# Patient Record
Sex: MALE | Race: WHITE | NOT HISPANIC OR LATINO | Employment: FULL TIME | URBAN - NONMETROPOLITAN AREA
[De-identification: names, ages, dates, MRNs, and addresses within clinical notes are randomized per-mention and may not be internally consistent; named-entity substitution may affect disease eponyms.]

---

## 2018-11-27 ENCOUNTER — TRANSCRIBE ORDERS (OUTPATIENT)
Dept: LAB | Facility: HOSPITAL | Age: 38
End: 2018-11-27

## 2018-11-27 ENCOUNTER — LAB (OUTPATIENT)
Dept: LAB | Facility: HOSPITAL | Age: 38
End: 2018-11-27
Attending: INTERNAL MEDICINE

## 2018-11-27 DIAGNOSIS — E78.5 HYPERLIPIDEMIA, UNSPECIFIED HYPERLIPIDEMIA TYPE: ICD-10-CM

## 2018-11-27 DIAGNOSIS — I10 HYPERTENSION, ESSENTIAL: Primary | ICD-10-CM

## 2018-11-27 DIAGNOSIS — I10 HYPERTENSION, ESSENTIAL: ICD-10-CM

## 2018-11-27 DIAGNOSIS — R53.83 TIREDNESS: ICD-10-CM

## 2018-11-27 DIAGNOSIS — E11.9 DIABETES MELLITUS WITHOUT COMPLICATION (HCC): ICD-10-CM

## 2018-11-27 LAB
ALBUMIN SERPL-MCNC: 4.4 G/DL (ref 3.4–4.8)
ALBUMIN/GLOB SERPL: 1.4 G/DL (ref 1.1–1.8)
ALP SERPL-CCNC: 161 U/L (ref 38–126)
ALT SERPL W P-5'-P-CCNC: 152 U/L (ref 21–72)
ANION GAP SERPL CALCULATED.3IONS-SCNC: 9 MMOL/L (ref 5–15)
ARTICHOKE IGE QN: 167 MG/DL (ref 1–129)
AST SERPL-CCNC: 184 U/L (ref 17–59)
BASOPHILS # BLD AUTO: 0.03 10*3/MM3 (ref 0–0.2)
BASOPHILS NFR BLD AUTO: 0.4 % (ref 0–2)
BILIRUB SERPL-MCNC: 1.1 MG/DL (ref 0.2–1.3)
BUN BLD-MCNC: 8 MG/DL (ref 7–21)
BUN/CREAT SERPL: 11.6 (ref 7–25)
CALCIUM SPEC-SCNC: 9.4 MG/DL (ref 8.4–10.2)
CHLORIDE SERPL-SCNC: 94 MMOL/L (ref 95–110)
CHOLEST SERPL-MCNC: 244 MG/DL (ref 0–199)
CO2 SERPL-SCNC: 32 MMOL/L (ref 22–31)
CREAT BLD-MCNC: 0.69 MG/DL (ref 0.7–1.3)
DEPRECATED RDW RBC AUTO: 35.2 FL (ref 35.1–43.9)
EOSINOPHIL # BLD AUTO: 0.19 10*3/MM3 (ref 0–0.7)
EOSINOPHIL NFR BLD AUTO: 2.7 % (ref 0–7)
ERYTHROCYTE [DISTWIDTH] IN BLOOD BY AUTOMATED COUNT: 12.1 % (ref 11.5–14.5)
GFR SERPL CREATININE-BSD FRML MDRD: 130 ML/MIN/1.73 (ref 70–162)
GLOBULIN UR ELPH-MCNC: 3.2 GM/DL (ref 2.3–3.5)
GLUCOSE BLD-MCNC: 217 MG/DL (ref 60–100)
HBA1C MFR BLD: 10.2 % (ref 4–5.6)
HCT VFR BLD AUTO: 47.5 % (ref 39–49)
HDLC SERPL-MCNC: 24 MG/DL (ref 60–200)
HGB BLD-MCNC: 16.9 G/DL (ref 13.7–17.3)
IMM GRANULOCYTES # BLD: 0.01 10*3/MM3 (ref 0–0.02)
IMM GRANULOCYTES NFR BLD: 0.1 % (ref 0–0.5)
LDLC/HDLC SERPL: 5.94 {RATIO} (ref 0–3.55)
LYMPHOCYTES # BLD AUTO: 2.07 10*3/MM3 (ref 0.6–4.2)
LYMPHOCYTES NFR BLD AUTO: 29.7 % (ref 10–50)
MCH RBC QN AUTO: 28.7 PG (ref 26.5–34)
MCHC RBC AUTO-ENTMCNC: 35.6 G/DL (ref 31.5–36.3)
MCV RBC AUTO: 80.6 FL (ref 80–98)
MONOCYTES # BLD AUTO: 0.38 10*3/MM3 (ref 0–0.9)
MONOCYTES NFR BLD AUTO: 5.4 % (ref 0–12)
NEUTROPHILS # BLD AUTO: 4.3 10*3/MM3 (ref 2–8.6)
NEUTROPHILS NFR BLD AUTO: 61.7 % (ref 37–80)
PLATELET # BLD AUTO: 152 10*3/MM3 (ref 150–450)
PMV BLD AUTO: 11.2 FL (ref 8–12)
POTASSIUM BLD-SCNC: 3.9 MMOL/L (ref 3.5–5.1)
PROT SERPL-MCNC: 7.6 G/DL (ref 6.3–8.6)
RBC # BLD AUTO: 5.89 10*6/MM3 (ref 4.37–5.74)
SODIUM BLD-SCNC: 135 MMOL/L (ref 137–145)
T4 FREE SERPL-MCNC: 1.29 NG/DL (ref 0.78–2.19)
TRIGL SERPL-MCNC: 387 MG/DL (ref 20–199)
TSH SERPL DL<=0.05 MIU/L-ACNC: 3.02 MIU/ML (ref 0.46–4.68)
WBC NRBC COR # BLD: 6.98 10*3/MM3 (ref 3.2–9.8)

## 2018-11-27 PROCEDURE — 80061 LIPID PANEL: CPT

## 2018-11-27 PROCEDURE — 36415 COLL VENOUS BLD VENIPUNCTURE: CPT

## 2018-11-27 PROCEDURE — 85025 COMPLETE CBC W/AUTO DIFF WBC: CPT

## 2018-11-27 PROCEDURE — 84443 ASSAY THYROID STIM HORMONE: CPT

## 2018-11-27 PROCEDURE — 83036 HEMOGLOBIN GLYCOSYLATED A1C: CPT

## 2018-11-27 PROCEDURE — 84439 ASSAY OF FREE THYROXINE: CPT

## 2018-11-27 PROCEDURE — 80053 COMPREHEN METABOLIC PANEL: CPT

## 2019-10-16 ENCOUNTER — OFFICE VISIT (OUTPATIENT)
Dept: PODIATRY | Facility: CLINIC | Age: 39
End: 2019-10-16

## 2019-10-16 VITALS — HEART RATE: 98 BPM | HEIGHT: 70 IN | WEIGHT: 262 LBS | BODY MASS INDEX: 37.51 KG/M2 | OXYGEN SATURATION: 97 %

## 2019-10-16 DIAGNOSIS — M79.2 NEUROPATHIC PAIN OF FOOT, UNSPECIFIED LATERALITY: ICD-10-CM

## 2019-10-16 DIAGNOSIS — IMO0002 DM TYPE 2, UNCONTROLLED, WITH NEUROPATHY: Primary | ICD-10-CM

## 2019-10-16 LAB — GLUCOSE BLDC GLUCOMTR-MCNC: 254 MG/DL (ref 70–130)

## 2019-10-16 PROCEDURE — 82962 GLUCOSE BLOOD TEST: CPT | Performed by: PODIATRIST

## 2019-10-16 PROCEDURE — 99204 OFFICE O/P NEW MOD 45 MIN: CPT | Performed by: PODIATRIST

## 2019-10-16 RX ORDER — PRAVASTATIN SODIUM 80 MG/1
80 TABLET ORAL DAILY
COMMUNITY
Start: 2019-10-15

## 2019-10-16 RX ORDER — HYDROCHLOROTHIAZIDE 25 MG/1
25 TABLET ORAL DAILY
Refills: 0 | COMMUNITY
Start: 2019-07-18 | End: 2021-08-31

## 2019-10-16 RX ORDER — L-METHYLFOLATE-ALGAE-VIT B12-B6 CAP 3-90.314-2-35 MG 3-90.314-2-35 MG
1 CAP ORAL 2 TIMES DAILY
Qty: 60 CAPSULE | Refills: 1 | Status: SHIPPED | OUTPATIENT
Start: 2019-10-16 | End: 2021-08-31

## 2019-10-16 RX ORDER — LOSARTAN POTASSIUM 100 MG/1
TABLET ORAL
COMMUNITY
Start: 2019-10-15 | End: 2021-08-31

## 2019-10-16 RX ORDER — OMEPRAZOLE 40 MG/1
40 CAPSULE, DELAYED RELEASE ORAL DAILY
COMMUNITY
End: 2021-08-31

## 2019-10-16 RX ORDER — ASPIRIN 81 MG/1
81 TABLET, CHEWABLE ORAL DAILY
COMMUNITY

## 2019-10-16 RX ORDER — DULOXETIN HYDROCHLORIDE 30 MG/1
30 CAPSULE, DELAYED RELEASE ORAL DAILY
Qty: 30 CAPSULE | Refills: 0 | Status: SHIPPED | OUTPATIENT
Start: 2019-10-16 | End: 2021-12-21

## 2019-10-16 NOTE — PROGRESS NOTES
Fabrizio Castaneda  1980  39 y.o. male   KEYUR Juarez - states a couple of months ago  BS - 254 taken in office    Patient presents today with a complaint of diabetic neuropathy.    10/16/2019    Chief Complaint   Patient presents with   • Left Foot - diabetic foot care   • Right Foot - diabetic foot care       History of Present Illness    Fabrizio Castaneda is a 39 y.o.male presents to clinic today with chief complaint of foot pain.  Patient states for the last year he has had increasing pain to both of his feet.  He describes the pain as burning, tingling and pins-and-needles.  Pain is constant.  He rates the pain as an 8 out of 10.  He has done nothing to treat the pain.  He does admit to being a type II diabetic.  His blood sugar today was 254 mg/dL.  He denies any injuries to his feet.  He has no other pedal complaints.      Past Medical History:   Diagnosis Date   • Diabetes mellitus (CMS/[facility])    • GERD (gastroesophageal reflux disease)          History reviewed. No pertinent surgical history.      Family History   Problem Relation Age of Onset   • Diabetes Mother    • Hypertension Mother        Allergies   Allergen Reactions   • Sulfa Antibiotics Rash       Social History     Socioeconomic History   • Marital status:      Spouse name: Not on file   • Number of children: Not on file   • Years of education: Not on file   • Highest education level: Not on file   Tobacco Use   • Smoking status: Current Every Day Smoker     Types: Cigarettes   • Smokeless tobacco: Never Used   Substance and Sexual Activity   • Alcohol use: Defer   • Drug use: Defer   • Sexual activity: Defer         Current Outpatient Medications   Medication Sig Dispense Refill   • aspirin 81 MG chewable tablet Chew 81 mg Daily.     • hydroCHLOROthiazide (HYDRODIURIL) 25 MG tablet Take 25 mg by mouth Daily.  0   • losartan (COZAAR) 100 MG tablet      • metFORMIN (GLUCOPHAGE) 500 MG tablet Take 1,000 mg by mouth 2 (Two) Times  "a Day.  0   • omeprazole (priLOSEC) 40 MG capsule Take 40 mg by mouth Daily.     • pravastatin (PRAVACHOL) 80 MG tablet      • DULoxetine (CYMBALTA) 30 MG capsule Take 1 capsule by mouth Daily. 30 capsule 0   • l-methylfolate-algae-B6-B12 (METANX) 3-90.314-2-35 MG capsule capsule Take 1 capsule by mouth 2 (Two) Times a Day. 60 capsule 1     No current facility-administered medications for this visit.        Review of Systems   Constitutional: Negative.    HENT: Negative.    Eyes: Negative.    Respiratory: Negative.    Cardiovascular: Negative.    Gastrointestinal: Negative.    Endocrine: Negative.    Musculoskeletal:        Foot pain   Skin: Negative.    Allergic/Immunologic: Negative.    Neurological: Positive for numbness. Negative for dizziness.   Psychiatric/Behavioral: Negative.          OBJECTIVE    Pulse 98   Ht 177.8 cm (70\")   Wt 119 kg (262 lb)   SpO2 97%   BMI 37.59 kg/m²       Physical Exam   Constitutional: He is oriented to person, place, and time. He appears well-developed and well-nourished. No distress.   HENT:   Head: Normocephalic and atraumatic.   Nose: Nose normal.   Eyes: Conjunctivae and EOM are normal. Pupils are equal, round, and reactive to light.   Neck: Normal range of motion. No tracheal deviation present.   Cardiovascular: Normal rate and intact distal pulses.   Pulmonary/Chest: Effort normal. No respiratory distress. He has no wheezes.   Musculoskeletal: Normal range of motion. He exhibits no edema or deformity.    Fabrizio had a diabetic foot exam performed today.   During the foot exam he had a monofilament test performed.  Neurological: He is alert and oriented to person, place, and time. He displays normal reflexes.   Skin: Skin is warm and dry. Capillary refill takes less than 2 seconds.   Psychiatric: He has a normal mood and affect. His behavior is normal. Thought content normal.   Vitals reviewed.      Gait: Normal    Assistive Device: None    Lower " Extremity    Cardiovascular:    DP/PT pulses palpable bilateral  CFT brisk  to all digits  Skin temp is warm to warm from proximal tibia to distal digits bilateral  Pedal hair growth present.   No erythema or edema noted   Musculoskeletal:  Muscle strength is 5/5 for all muscle groups tested   ROM of the 1st MTP is WNL bilateral   ROM of the MTJ is WNL bilateral   ROM of the STJ is WNL bilateral   ROM of the ankle joint is  WNL bilateral   No pain on palpation noted bilateral  Dermatological:   Skin is warm, dry and intact bilateral  Webspaces 1-4 bilateral are clean, dry and intact.   No subcutaneous nodules or masses noted    Neurological:   Vibratory sensation intact at the H IPJ  Protective sensation intact   Sensation intact to light touch        Procedures        ASSESSMENT AND PLAN    Fabrizio was seen today for diabetic foot care and diabetic foot care.    Diagnoses and all orders for this visit:    DM type 2, uncontrolled, with neuropathy (CMS/[facility])  -     POC Glucose Fingerstick    Neuropathic pain of foot, unspecified laterality    Other orders  -     DULoxetine (CYMBALTA) 30 MG capsule; Take 1 capsule by mouth Daily.  -     l-methylfolate-algae-B6-B12 (METANX) 3-90.314-2-35 MG capsule capsule; Take 1 capsule by mouth 2 (Two) Times a Day.      - A diabetic foot screening exam was performed and the patient was educated on the foot complications related to diabetes,  preventative foot care and what signs and symptoms to watch for.  Instructed to contact our office if any foot problems develop before next visit.   - Rx for metanx  - Rx for cymbalta  - All questions were answered  - Angel 1 year for annual diabetic foot exam            This document has been electronically signed by Wolf Rich DPM on October 16, 2019 11:41 AM     10/16/2019  11:41 AM

## 2020-03-23 RX ORDER — APIXABAN 5 MG/1
TABLET, FILM COATED ORAL
Qty: 180 TABLET | Refills: 0 | OUTPATIENT
Start: 2020-03-23

## 2021-04-14 ENCOUNTER — OFFICE VISIT (OUTPATIENT)
Dept: PODIATRY | Facility: CLINIC | Age: 41
End: 2021-04-14

## 2021-04-14 VITALS — WEIGHT: 262 LBS | BODY MASS INDEX: 37.51 KG/M2 | HEIGHT: 70 IN | HEART RATE: 109 BPM | OXYGEN SATURATION: 97 %

## 2021-04-14 DIAGNOSIS — M79.2 NEUROPATHIC PAIN OF FOOT, UNSPECIFIED LATERALITY: Primary | ICD-10-CM

## 2021-04-14 PROCEDURE — 99214 OFFICE O/P EST MOD 30 MIN: CPT | Performed by: PODIATRIST

## 2021-04-14 RX ORDER — GABAPENTIN 600 MG/1
600 TABLET ORAL 3 TIMES DAILY
COMMUNITY
Start: 2021-04-06 | End: 2021-08-31

## 2021-04-14 RX ORDER — AMLODIPINE BESYLATE 10 MG/1
10 TABLET ORAL DAILY
COMMUNITY
Start: 2021-01-30 | End: 2022-02-14

## 2021-04-14 RX ORDER — DULOXETIN HYDROCHLORIDE 60 MG/1
60 CAPSULE, DELAYED RELEASE ORAL DAILY
COMMUNITY
Start: 2021-04-05 | End: 2021-12-21 | Stop reason: SDUPTHER

## 2021-04-14 NOTE — PROGRESS NOTES
Fabrizio Castaneda  1980  40 y.o. male   REED Sevilla - 3/2021 ( does not recall exact date)    04/14/2021     Chief Complaint   Patient presents with   • Left Foot - diabetic foot care, Pain   • Right Foot - diabetic foot care, Pain       History of Present Illness    Fabrizio Castaneda is a 40 y.o.male presents to clinic today with chief complaint of foot pain.  Patient is a type II diabetic.  He states his blood sugars are improving.  However, he complains of severe pain to both of his feet.  He describes his pain as burning tingling, pins-and-needles pain.  He is on gabapentin and Cymbalta.  He states that this does help.  However, his quality of life is significantly affected by his nerve pain.  Currently he rates his pain as a 7 out of 10.    Past Medical History:   Diagnosis Date   • Diabetes mellitus (CMS/[facility])    • GERD (gastroesophageal reflux disease)          History reviewed. No pertinent surgical history.      Family History   Problem Relation Age of Onset   • Diabetes Mother    • Hypertension Mother        Allergies   Allergen Reactions   • Sulfa Antibiotics Rash       Social History     Socioeconomic History   • Marital status:      Spouse name: Not on file   • Number of children: Not on file   • Years of education: Not on file   • Highest education level: Not on file   Tobacco Use   • Smoking status: Current Every Day Smoker     Types: Cigarettes   • Smokeless tobacco: Never Used   Vaping Use   • Vaping Use: Never used   Substance and Sexual Activity   • Alcohol use: Defer   • Drug use: Defer   • Sexual activity: Defer         Current Outpatient Medications   Medication Sig Dispense Refill   • amLODIPine (NORVASC) 10 MG tablet Take 10 mg by mouth Daily.     • aspirin 81 MG chewable tablet Chew 81 mg Daily.     • DULoxetine (CYMBALTA) 60 MG capsule Take 60 mg by mouth Daily.     • gabapentin (NEURONTIN) 600 MG tablet Take 600 mg by mouth 3 (Three) Times a Day.     •  "hydroCHLOROthiazide (HYDRODIURIL) 25 MG tablet Take 25 mg by mouth Daily.  0   • l-methylfolate-algae-B6-B12 (METANX) 3-90.314-2-35 MG capsule capsule Take 1 capsule by mouth 2 (Two) Times a Day. 60 capsule 1   • losartan (COZAAR) 100 MG tablet      • metFORMIN (GLUCOPHAGE) 500 MG tablet Take 1,000 mg by mouth 2 (Two) Times a Day.  0   • omeprazole (priLOSEC) 40 MG capsule Take 40 mg by mouth Daily.     • pravastatin (PRAVACHOL) 80 MG tablet      • DULoxetine (CYMBALTA) 30 MG capsule Take 1 capsule by mouth Daily. 30 capsule 0     No current facility-administered medications for this visit.       Review of Systems   Constitutional: Negative.    HENT: Negative.    Eyes: Negative.    Respiratory: Negative.    Cardiovascular: Negative.    Gastrointestinal: Negative.    Endocrine: Negative.    Musculoskeletal:        Bilateral foot pain     Skin: Negative.    Allergic/Immunologic: Negative.    Neurological: Positive for numbness.   Psychiatric/Behavioral: Negative.          OBJECTIVE    Pulse 109   Ht 177.8 cm (70\")   Wt 119 kg (262 lb)   SpO2 97%   BMI 37.59 kg/m²       Physical Exam   Constitutional: He is oriented to person, place, and time. He appears well-developed. No distress.   HENT:   Head: Normocephalic and atraumatic.   Nose: Nose normal.   Eyes: Pupils are equal, round, and reactive to light. Conjunctivae are normal.   Neck: No tracheal deviation present.   Cardiovascular: Normal rate.   Pulmonary/Chest: Effort normal. No respiratory distress. He has no wheezes.   Musculoskeletal: Normal range of motion. No deformity.    Fabrizio had a diabetic foot exam performed today.   During the foot exam he had a monofilament test performed.  Neurological: He is alert and oriented to person, place, and time. He displays normal reflexes.   Skin: Skin is warm and dry. Capillary refill takes less than 2 seconds.   Psychiatric: His behavior is normal. Thought content normal.   Vitals reviewed.      Gait: " Normal    Assistive Device: None    Lower Extremity    Cardiovascular:    DP/PT pulses palpable bilateral  CFT brisk  to all digits  Skin temp is warm to warm from proximal tibia to distal digits bilateral  Pedal hair growth present.   No erythema or edema noted   Musculoskeletal:  Muscle strength is 5/5 for all muscle groups tested   ROM of the 1st MTP is WNL bilateral   ROM of the MTJ is WNL bilateral   ROM of the STJ is WNL bilateral   ROM of the ankle joint is  WNL bilateral   No pain on palpation noted bilateral  Dermatological:   Skin is warm, dry and intact bilateral  Webspaces 1-4 bilateral are clean, dry and intact.   No subcutaneous nodules or masses noted    Neurological:   Vibratory sensation intact at the H IPJ  Protective sensation intact   Sensation intact to light touch        Procedures        ASSESSMENT AND PLAN    Diagnoses and all orders for this visit:    1. Neuropathic pain of foot, unspecified laterality (Primary)  -     Ambulatory Referral to Neurology      - A diabetic foot screening exam was performed and the patient was educated on the foot complications related to diabetes,  preventative foot care and what signs and symptoms to watch for.  Instructed to contact our office if any foot problems develop before next visit.   -Agree with increasing dose of gabapentin.  Will add compounded pain cream.  Rx faxed to [facility] pharmacy.  -Referral to neurology for evaluation and recommendations for treatment.  - All questions were answered  - Angel 1 year for annual diabetic foot exam            This document has been electronically signed by Wolf Rich DPM on April 14, 2021 17:47 CDT     4/14/2021  17:47 CDT

## 2021-08-31 ENCOUNTER — OFFICE VISIT (OUTPATIENT)
Dept: NEUROLOGY | Facility: CLINIC | Age: 41
End: 2021-08-31

## 2021-08-31 VITALS
OXYGEN SATURATION: 96 % | HEIGHT: 70 IN | WEIGHT: 268 LBS | HEART RATE: 77 BPM | BODY MASS INDEX: 38.37 KG/M2 | SYSTOLIC BLOOD PRESSURE: 160 MMHG | DIASTOLIC BLOOD PRESSURE: 100 MMHG | RESPIRATION RATE: 18 BRPM

## 2021-08-31 DIAGNOSIS — E11.42 DIABETIC POLYNEUROPATHY ASSOCIATED WITH TYPE 2 DIABETES MELLITUS (HCC): Primary | ICD-10-CM

## 2021-08-31 PROCEDURE — 99204 OFFICE O/P NEW MOD 45 MIN: CPT | Performed by: PHYSICIAN ASSISTANT

## 2021-08-31 RX ORDER — MEXILETINE HYDROCHLORIDE 150 MG/1
300 CAPSULE ORAL DAILY
Qty: 60 CAPSULE | Refills: 6 | Status: SHIPPED | OUTPATIENT
Start: 2021-09-30 | End: 2021-12-21 | Stop reason: SDUPTHER

## 2021-08-31 RX ORDER — GABAPENTIN 800 MG/1
1 TABLET ORAL 3 TIMES DAILY
COMMUNITY
Start: 2021-08-18 | End: 2022-02-14

## 2021-08-31 RX ORDER — DESOXIMETASONE 0.5 MG/G
1 OINTMENT TOPICAL 2 TIMES DAILY
COMMUNITY
Start: 2021-05-24 | End: 2021-12-21

## 2021-08-31 RX ORDER — OMEPRAZOLE 20 MG/1
20 CAPSULE, DELAYED RELEASE ORAL DAILY
COMMUNITY

## 2021-08-31 RX ORDER — MEXILETINE HYDROCHLORIDE 150 MG/1
CAPSULE ORAL
Qty: 57 CAPSULE | Refills: 0 | Status: SHIPPED | OUTPATIENT
Start: 2021-08-31 | End: 2021-12-21

## 2021-09-28 NOTE — PROGRESS NOTES
Subjective   Fabrizio Castaneda is a 41 y.o. male is being seen for consultation today at the request of Wolf Rich DPM    The patient is a known diabetic with a history of diabetes referred by podiatry with complaints consistent with painful diabetic peripheral neuropathy in the lower extremities.  This has not been responsive satisfactorily to Neurontin and Cymbalta.       The following portions of the patient's history were reviewed and updated as appropriate: allergies, current medications, past family history, past medical history, past social history, past surgical history and problem list.    Review of Systems   Eyes: Negative.    Respiratory: Negative.    Cardiovascular: Negative.    Gastrointestinal: Positive for GERD.   Musculoskeletal: Positive for gait problem.   Skin: Negative.    Neurological: Positive for weakness and numbness.   Psychiatric/Behavioral: Positive for sleep disturbance.         Current Outpatient Medications:   •  amLODIPine (NORVASC) 10 MG tablet, Take 10 mg by mouth Daily., Disp: , Rfl:   •  aspirin 81 MG chewable tablet, Chew 81 mg Daily., Disp: , Rfl:   •  Desoximetasone 0.05 % ointment, 1 application 2 (two) times a day., Disp: , Rfl:   •  DULoxetine (CYMBALTA) 30 MG capsule, Take 1 capsule by mouth Daily. (Patient taking differently: Take 30 mg by mouth Daily. Takes 1 tablet in the morning.), Disp: 30 capsule, Rfl: 0  •  DULoxetine (CYMBALTA) 60 MG capsule, Take 60 mg by mouth Daily. Takes 1 tablet at bedtime., Disp: , Rfl:   •  gabapentin (NEURONTIN) 800 MG tablet, 1 tablet 3 (Three) Times a Day., Disp: , Rfl:   •  metFORMIN (GLUCOPHAGE) 500 MG tablet, Take 1,000 mg by mouth 2 (Two) Times a Day., Disp: , Rfl: 0  •  omeprazole (priLOSEC) 20 MG capsule, Take 20 mg by mouth Daily., Disp: , Rfl:   •  pravastatin (PRAVACHOL) 80 MG tablet, , Disp: , Rfl:   •  mexiletine (MEXITIL) 150 MG capsule, Take 1 capsule by mouth Daily for 3 days, THEN 2 capsules Daily for 27 days.,  Disp: 57 capsule, Rfl: 0  •  [START ON 9/30/2021] mexiletine (MEXITIL) 150 MG capsule, Take 2 capsules by mouth Daily., Disp: 60 capsule, Rfl: 6     Objective   Physical Exam  Vitals and nursing note reviewed.   HENT:      Head: Normocephalic.      Right Ear: Hearing and external ear normal.      Left Ear: Hearing and external ear normal.   Eyes:      General: Lids are normal. Vision grossly intact. Gaze aligned appropriately. No scleral icterus.     Extraocular Movements: Extraocular movements intact.      Conjunctiva/sclera: Conjunctivae normal.      Pupils: Pupils are equal, round, and reactive to light.   Neck:      Trachea: Trachea and phonation normal.   Cardiovascular:      Rate and Rhythm: Normal rate.   Pulmonary:      Effort: Pulmonary effort is normal.   Musculoskeletal:      Cervical back: Full passive range of motion without pain.   Skin:     General: Skin is warm and dry.   Neurological:      Mental Status: He is alert and oriented to person, place, and time.      GCS: GCS eye subscore is 4. GCS verbal subscore is 5. GCS motor subscore is 6.      Cranial Nerves: Cranial nerves are intact.      Sensory: Sensory deficit present.      Motor: Weakness present.      Coordination: Coordination is intact.      Gait: Gait is intact.      Deep Tendon Reflexes: Reflexes abnormal.      Reflex Scores:       Bicep reflexes are 1+ on the right side and 1+ on the left side.       Patellar reflexes are 1+ on the right side and 1+ on the left side.       Achilles reflexes are 0 on the right side and 0 on the left side.     Comments: Peripheral decreased sensation bilateral lower extremities in a stocking distribution with associated decreased distal vibratory sensation as compared to proximal.   Psychiatric:         Attention and Perception: Attention and perception normal.         Mood and Affect: Mood and affect normal.         Speech: Speech normal.         Behavior: Behavior normal.         Thought Content: Thought  content normal.         Cognition and Memory: Cognition and memory normal.         Judgment: Judgment normal.           Assessment/Plan   Diagnoses and all orders for this visit:    1. Diabetic polyneuropathy associated with type 2 diabetes mellitus (CMS/HCC) (Primary)  -     mexiletine (MEXITIL) 150 MG capsule; Take 1 capsule by mouth Daily for 3 days, THEN 2 capsules Daily for 27 days.  Dispense: 57 capsule; Refill: 0  -     mexiletine (MEXITIL) 150 MG capsule; Take 2 capsules by mouth Daily.  Dispense: 60 capsule; Refill: 6  -     EMG & Nerve Conduction Test; Future      The patient's history and physical examination is consistent with painful diabetic peripheral neuropathy.  The patient has only partially responded to Neurontin and Cymbalta.  I recommended a trial of mexiletine starting at 150 mg once daily for 3 days then increase to 150 mg x 2 daily.    An EMG nerve conduction study of bilateral lower extremities is recommended.    Medication therapies and testing expectations are reviewed in detail with the patient and his family.  Questions and concerns were discussed in detail.  A follow-up appointment is established after testing.             Dictated utilizing Dragon dictation.

## 2021-10-12 ENCOUNTER — APPOINTMENT (OUTPATIENT)
Dept: NEUROLOGY | Facility: HOSPITAL | Age: 41
End: 2021-10-12

## 2021-10-26 ENCOUNTER — APPOINTMENT (OUTPATIENT)
Dept: NEUROLOGY | Facility: HOSPITAL | Age: 41
End: 2021-10-26

## 2021-12-10 DIAGNOSIS — E11.42 DIABETIC POLYNEUROPATHY ASSOCIATED WITH TYPE 2 DIABETES MELLITUS (HCC): ICD-10-CM

## 2021-12-10 RX ORDER — MEXILETINE HYDROCHLORIDE 150 MG/1
CAPSULE ORAL
Qty: 57 CAPSULE | Refills: 0 | OUTPATIENT
Start: 2021-12-10 | End: 2022-01-09

## 2021-12-21 ENCOUNTER — OFFICE VISIT (OUTPATIENT)
Dept: NEUROLOGY | Facility: CLINIC | Age: 41
End: 2021-12-21

## 2021-12-21 VITALS
OXYGEN SATURATION: 98 % | DIASTOLIC BLOOD PRESSURE: 88 MMHG | HEART RATE: 95 BPM | BODY MASS INDEX: 38.65 KG/M2 | SYSTOLIC BLOOD PRESSURE: 140 MMHG | WEIGHT: 270 LBS | HEIGHT: 70 IN

## 2021-12-21 DIAGNOSIS — E11.42 DIABETIC POLYNEUROPATHY ASSOCIATED WITH TYPE 2 DIABETES MELLITUS (HCC): Primary | ICD-10-CM

## 2021-12-21 PROCEDURE — 99214 OFFICE O/P EST MOD 30 MIN: CPT | Performed by: PHYSICIAN ASSISTANT

## 2021-12-21 RX ORDER — MEXILETINE HYDROCHLORIDE 250 MG/1
250 CAPSULE ORAL 2 TIMES DAILY
Qty: 60 CAPSULE | Refills: 6 | Status: SHIPPED | OUTPATIENT
Start: 2021-12-21 | End: 2022-03-14 | Stop reason: SDUPTHER

## 2021-12-21 RX ORDER — DULOXETIN HYDROCHLORIDE 60 MG/1
60 CAPSULE, DELAYED RELEASE ORAL 2 TIMES DAILY
Qty: 60 CAPSULE | Refills: 6 | Status: SHIPPED | OUTPATIENT
Start: 2021-12-21 | End: 2022-01-18 | Stop reason: SDUPTHER

## 2021-12-21 NOTE — PROGRESS NOTES
Subjective   Fabrizio Castaneda is a 41 y.o. male who presents for follow-up of painful diabetic peripheral neuropathy.     History of Present Illness     painful diabetic peripheral neuropathy  He reports no change in his symptoms. His Cymbalta was decreased but he is unsure why. He would like this corrected. The Cymbalta did provide some relief. He continues to have burning in his bilateral feet. He reports stepping on a nail yesterday and he was completely unaware. He notes the only reason he realized the injury is because he felt a tug in his shoe. He reports monitoring the wound and denies any associated redness.   Of note, he was scheduled for an EMG nerve conduction study, however, he note his co-pay for the study was $1000.     The following portions of the patient's history were reviewed and updated as appropriate: allergies, current medications, past family history, past medical history, past social history, past surgical history and problem list.    Review of Systems:  A review of systems was performed, and positive findings are noted in the HPI.      Current Outpatient Medications:   •  amLODIPine (NORVASC) 10 MG tablet, Take 10 mg by mouth Daily., Disp: , Rfl:   •  aspirin 81 MG chewable tablet, Chew 81 mg Daily., Disp: , Rfl:   •  DULoxetine (CYMBALTA) 60 MG capsule, Take 1 capsule by mouth 2 (Two) Times a Day. Takes 1 tablet at bedtime., Disp: 60 capsule, Rfl: 6  •  gabapentin (NEURONTIN) 800 MG tablet, Take 1 tablet by mouth 3 (Three) Times a Day., Disp: , Rfl:   •  metFORMIN (GLUCOPHAGE) 500 MG tablet, Take 1,000 mg by mouth 2 (Two) Times a Day., Disp: , Rfl: 0  •  mexiletine (MEXITIL) 250 MG capsule, Take 1 capsule by mouth 2 (Two) Times a Day., Disp: 60 capsule, Rfl: 6  •  omeprazole (priLOSEC) 20 MG capsule, Take 20 mg by mouth Daily., Disp: , Rfl:   •  pravastatin (PRAVACHOL) 80 MG tablet, Take 80 mg by mouth Daily., Disp: , Rfl:      Objective   Physical Exam  Vitals and nursing note  reviewed.   HENT:      Head: Normocephalic.      Right Ear: Hearing and external ear normal.      Left Ear: Hearing and external ear normal.      Nose: Nose normal.      Mouth/Throat:      Pharynx: Oropharynx is clear.   Eyes:      General: Lids are normal. Vision grossly intact. Gaze aligned appropriately. No scleral icterus.     Extraocular Movements: Extraocular movements intact.      Conjunctiva/sclera: Conjunctivae normal.      Pupils: Pupils are equal, round, and reactive to light.      Visual Fields: Right eye visual fields normal and left eye visual fields normal.   Neck:      Vascular: No carotid bruit or JVD.      Trachea: Trachea and phonation normal.   Cardiovascular:      Rate and Rhythm: Normal rate.      Heart sounds: Normal heart sounds.   Pulmonary:      Effort: Pulmonary effort is normal.      Breath sounds: Normal breath sounds.   Musculoskeletal:      Cervical back: Normal range of motion.   Skin:     General: Skin is warm and dry.   Neurological:      Mental Status: He is alert and oriented to person, place, and time.      GCS: GCS eye subscore is 4. GCS verbal subscore is 5. GCS motor subscore is 6.      Cranial Nerves: Cranial nerves are intact.      Sensory: Sensory deficit present.      Motor: Weakness present.      Coordination: Coordination is intact.      Gait: Gait is intact.      Deep Tendon Reflexes:      Reflex Scores:       Tricep reflexes are 1+ on the right side and 1+ on the left side.       Bicep reflexes are 1+ on the right side and 1+ on the left side.       Brachioradialis reflexes are 1+ on the right side and 1+ on the left side.       Achilles reflexes are 0 on the right side and 0 on the left side.     Comments: Reflexes are abnormal.   Deep tendon reflexes 1+ and symmetric in the upper extremities.   In the lower extremities, absent Achilles reflexes bilaterally, peripheral decreased sensation bilateral lower extremities in a stocking distribution associated with decreased  distal vibratory sensation as compared to proximal.    Psychiatric:         Attention and Perception: Attention and perception normal.         Mood and Affect: Mood and affect normal.         Speech: Speech normal.         Behavior: Behavior normal.         Thought Content: Thought content normal.         Cognition and Memory: Cognition and memory normal.         Judgment: Judgment normal.           Assessment/Plan   Diagnoses and all orders for this visit:    1. Diabetic polyneuropathy associated with type 2 diabetes mellitus (HCC) (Primary)  - Duloxetine will be increased to 120 mg/day, Mexitil will be increased to 500 mg/day, taking 250 mg twice a day. Further adjustments as needed. Gabapentin will continue at 800 mg three times per day.  He will call to update if his symptoms are worsening or improving.   -     DULoxetine (CYMBALTA) 60 MG capsule; Take 1 capsule by mouth 2 (Two) Times a Day. Takes 1 tablet at bedtime.  Dispense: 60 capsule; Refill: 6  -     mexiletine (MEXITIL) 250 MG capsule; Take 1 capsule by mouth 2 (Two) Times a Day.  Dispense: 60 capsule; Refill: 6               Transcribed from ambient dictation for SEBASTIAN Sebastian by Armaan NAIR Rep.  12/21/21   13:29 CST    Patient verbalized consent to the visit recording.  I have personally performed the services described in this document as transcribed by the above individual, and it is both accurate and complete.  SEBASTIAN Sebastian  12/21/2021  14:59 CST

## 2022-01-18 DIAGNOSIS — E11.42 DIABETIC POLYNEUROPATHY ASSOCIATED WITH TYPE 2 DIABETES MELLITUS: ICD-10-CM

## 2022-01-18 RX ORDER — DULOXETIN HYDROCHLORIDE 60 MG/1
60 CAPSULE, DELAYED RELEASE ORAL 2 TIMES DAILY
Qty: 180 CAPSULE | Refills: 1 | Status: SHIPPED | OUTPATIENT
Start: 2022-01-18 | End: 2022-07-26

## 2022-02-14 ENCOUNTER — HOSPITAL ENCOUNTER (OUTPATIENT)
Dept: SLEEP MEDICINE | Facility: HOSPITAL | Age: 42
Discharge: HOME | End: 2022-02-14
Admitting: NURSE PRACTITIONER

## 2022-02-14 ENCOUNTER — OFFICE VISIT (OUTPATIENT)
Dept: SLEEP MEDICINE | Facility: HOSPITAL | Age: 42
End: 2022-02-14

## 2022-02-14 VITALS
WEIGHT: 279 LBS | OXYGEN SATURATION: 98 % | BODY MASS INDEX: 39.94 KG/M2 | HEART RATE: 93 BPM | SYSTOLIC BLOOD PRESSURE: 153 MMHG | HEIGHT: 70 IN | DIASTOLIC BLOOD PRESSURE: 96 MMHG

## 2022-02-14 DIAGNOSIS — R06.83 SNORING: Primary | ICD-10-CM

## 2022-02-14 DIAGNOSIS — E66.01 MORBID OBESITY: ICD-10-CM

## 2022-02-14 DIAGNOSIS — R35.1 NOCTURIA: ICD-10-CM

## 2022-02-14 DIAGNOSIS — G47.19 EXCESSIVE DAYTIME SLEEPINESS: ICD-10-CM

## 2022-02-14 DIAGNOSIS — R06.83 SNORING: ICD-10-CM

## 2022-02-14 DIAGNOSIS — G47.00 FREQUENT NOCTURNAL AWAKENING: ICD-10-CM

## 2022-02-14 PROCEDURE — 99203 OFFICE O/P NEW LOW 30 MIN: CPT | Performed by: NURSE PRACTITIONER

## 2022-02-14 PROCEDURE — 95800 SLP STDY UNATTENDED: CPT

## 2022-02-14 PROCEDURE — 95800 SLP STDY UNATTENDED: CPT | Performed by: PSYCHIATRY & NEUROLOGY

## 2022-02-14 RX ORDER — AMLODIPINE BESYLATE 5 MG/1
5 TABLET ORAL DAILY
COMMUNITY

## 2022-02-14 RX ORDER — GABAPENTIN 800 MG/1
800 TABLET ORAL 4 TIMES DAILY
COMMUNITY

## 2022-02-14 NOTE — PROGRESS NOTES
New Patient Sleep Medicine Consultation    Encounter Date: 2/14/2022         Patient's Primary Care Provider: Prachi Sevilla APRN  Referring Provider: No ref. provider found  Reason for consultation/chief complaint: snoring, awakening gasping for breath, witnessed apneas, excessive daytime sleepiness and unrefreshing sleep    Farbizio Castaneda is a 41 y.o. male who admits to snoring, unrestful sleep, working night shift or rotating shifts, stopping breathing during sleep, sleeping less than 6 hours per night, disturbed or restless sleep, up to bathroom at night, teeth grinding, difficulty falling asleep and difficulty staying asleep.    He does not report cataplexy, sleep paralysis, or hypnagogic hallucinations. His bedtime is ~ 0700 (sometimes sleeps during the night on off days). He  falls asleep after ~ 60 minutes, and is up 4-5 times per night. He wakes up ~ 1200. He endorses 4 hours of sleep. He drinks 0 cups of coffee, 0 teas, and 4 sodas per day. He drinks 0 alcoholic beverages per week. He is a current smoker. He does not take sedatives or hypnotics. He has no sleepiness with driving. He naps very rarely.    [place] - 2    Prior Sleep Testing: None    Past Medical History:   Diagnosis Date   • Diabetes mellitus (HCC)    • Diabetic neuropathy (HCC)    • GERD (gastroesophageal reflux disease)    • Hypertension      Social History     Socioeconomic History   • Marital status:    Tobacco Use   • Smoking status: Current Every Day Smoker     Types: Cigarettes   • Smokeless tobacco: Never Used   Vaping Use   • Vaping Use: Never used   Substance and Sexual Activity   • Alcohol use: Defer   • Drug use: Defer   • Sexual activity: Defer     Family History   Problem Relation Age of Onset   • Diabetes Mother    • Hypertension Mother      Prior T&A, UPPP, maxillofacial, or bariatric surgery: None  Family history of sleep disorders: None  Other family history + for: As above  Occupation: Supervisor at  "factory  Marital status:   Children: 6  Has 1 brothers and 1 sisters  Smoking history: smoked 2 ppds from age 18 until present      Review of Systems:  Constitutional: positive for fatigue  Eyes: negative  Ears, nose, mouth, throat, and face: positive for snoring  Respiratory: positive for cough  Cardiovascular: negative  Gastrointestinal: negative  Genitourinary:negative  Integument/breast: negative  Hematologic/lymphatic: negative  Musculoskeletal:negative  Neurological: positive for paresthesia and peripheral neuropathy  Behavioral/Psych: negative  Endocrine: negative  Allergic/Immunologic: negative   Patient advised to discuss any positive ROS with PCP.      Vitals:    02/14/22 1401   BP: 153/96   Pulse: 93   SpO2: 98%           02/14/22  1401   Weight: 127 kg (279 lb)       Body mass index is 40.03 kg/m². Patient's Body mass index is 40.03 kg/m². indicating that he is morbidly obese (BMI > 40 or > 35 with obesity - related health condition). Obesity-related health conditions include the following: hypertension, diabetes mellitus and GERD. Obesity is unchanged. BMI is is above average; BMI management plan is completed. I recommend portion control and increasing exercise.       Tobacco Use: High Risk   • Smoking Tobacco Use: Current Every Day Smoker   • Smokeless Tobacco Use: Never Used       Physical Exam:        General: Alert. Cooperative. Well developed. No acute distress.   Head/Neck:  Normocephalic. Symmetrical. Atraumatic.     Neck circumference: 17.5\"             Eyes: Sclera clear. No icterus. PERRLA. Normal EOM.             Ears: No deformities. Normal hearing.             Nose: No septal deviation. No drainage.          Throat: No oral lesions. No thrush. Moist mucous membranes. Trachea midline.    Tongue is normal     Dentition is fair       Pharynx: Posterior pharyngeal pillars are narrow    Mallampati score of III (soft and hard palate and base of uvula visible)    Pharynx is nonerythematous, " with both tonsils moderately enlarged   Chest Wall:  Normal shape. Symmetric expansion with respiration. No tenderness.          Lungs:  Clear to auscultation bilaterally. No wheezes. No rhonchi. No rales. Respirations regular, even and unlabored.            Heart:  Regular rhythm and normal rate. Normal S1 and S2. No murmur.     Abdomen:  Soft, non-tender and non-distended. Normal bowel sounds. No masses.  Extremities:  Moves all extremities well. No edema.           Pulses: Pulses palpable and equal bilaterally.               Skin: Dry. Intact. No bleeding. No rash.           Neuro: Moves all 4 extremities and cranial nerves grossly intact.  Psychiatric: Normal mood and affect.      Current Outpatient Medications:   •  amLODIPine (NORVASC) 5 MG tablet, Take 5 mg by mouth Daily., Disp: , Rfl:   •  aspirin 81 MG chewable tablet, Chew 81 mg Daily., Disp: , Rfl:   •  DULoxetine (CYMBALTA) 60 MG capsule, Take 1 capsule by mouth 2 (Two) Times a Day., Disp: 180 capsule, Rfl: 1  •  gabapentin (NEURONTIN) 800 MG tablet, Take 800 mg by mouth 4 (Four) Times a Day., Disp: , Rfl:   •  metFORMIN (GLUCOPHAGE) 500 MG tablet, Take 1,000 mg by mouth 2 (Two) Times a Day., Disp: , Rfl: 0  •  mexiletine (MEXITIL) 250 MG capsule, Take 1 capsule by mouth 2 (Two) Times a Day., Disp: 60 capsule, Rfl: 6  •  omeprazole (priLOSEC) 20 MG capsule, Take 20 mg by mouth Daily., Disp: , Rfl:   •  pravastatin (PRAVACHOL) 80 MG tablet, Take 80 mg by mouth Daily., Disp: , Rfl:     WBC   Date Value Ref Range Status   11/27/2018 6.98 3.20 - 9.80 10*3/mm3 Final     RBC   Date Value Ref Range Status   11/27/2018 5.89 (H) 4.37 - 5.74 10*6/mm3 Final     Hemoglobin   Date Value Ref Range Status   11/27/2018 16.9 13.7 - 17.3 g/dL Final     Hematocrit   Date Value Ref Range Status   11/27/2018 47.5 39.0 - 49.0 % Final     MCV   Date Value Ref Range Status   11/27/2018 80.6 80.0 - 98.0 fL Final     MCH   Date Value Ref Range Status   11/27/2018 28.7 26.5 -  34.0 pg Final     MCHC   Date Value Ref Range Status   11/27/2018 35.6 31.5 - 36.3 g/dL Final     RDW   Date Value Ref Range Status   11/27/2018 12.1 11.5 - 14.5 % Final     RDW-SD   Date Value Ref Range Status   11/27/2018 35.2 35.1 - 43.9 fl Final     MPV   Date Value Ref Range Status   11/27/2018 11.2 8.0 - 12.0 fL Final     Platelets   Date Value Ref Range Status   11/27/2018 152 150 - 450 10*3/mm3 Final     Neutrophil %   Date Value Ref Range Status   11/27/2018 61.7 37.0 - 80.0 % Final     Lymphocyte %   Date Value Ref Range Status   11/27/2018 29.7 10.0 - 50.0 % Final     Monocyte %   Date Value Ref Range Status   11/27/2018 5.4 0.0 - 12.0 % Final     Eosinophil %   Date Value Ref Range Status   11/27/2018 2.7 0.0 - 7.0 % Final     Basophil %   Date Value Ref Range Status   11/27/2018 0.4 0.0 - 2.0 % Final     Immature Grans %   Date Value Ref Range Status   11/27/2018 0.1 0.0 - 0.5 % Final     Neutrophils, Absolute   Date Value Ref Range Status   11/27/2018 4.30 2.00 - 8.60 10*3/mm3 Final     Lymphocytes, Absolute   Date Value Ref Range Status   11/27/2018 2.07 0.60 - 4.20 10*3/mm3 Final     Monocytes, Absolute   Date Value Ref Range Status   11/27/2018 0.38 0.00 - 0.90 10*3/mm3 Final     Eosinophils, Absolute   Date Value Ref Range Status   11/27/2018 0.19 0.00 - 0.70 10*3/mm3 Final     Basophils, Absolute   Date Value Ref Range Status   11/27/2018 0.03 0.00 - 0.20 10*3/mm3 Final     Immature Grans, Absolute   Date Value Ref Range Status   11/27/2018 0.01 0.00 - 0.02 10*3/mm3 Final     Lab Results   Component Value Date    GLUCOSE 217 (H) 11/27/2018    BUN 8 11/27/2018    CREATININE 0.69 (L) 11/27/2018    EGFRIFNONA 130 11/27/2018    BCR 11.6 11/27/2018    K 3.9 11/27/2018    CO2 32.0 (H) 11/27/2018    CALCIUM 9.4 11/27/2018    ALBUMIN 4.40 11/27/2018     (H) 11/27/2018     (H) 11/27/2018       Contraindications to home sleep test: none    ASSESSMENT:  1. Excessive daytime sleepiness, presumed  obstructive sleep apnea - New (to me), additional work-up planned (4)  1. Check home sleep study   2. Snoring, presumed obstructive sleep apnea - New (to me), additional work-up planned (4)  1. As above  3. Frequent nocturnal awakenings, nocturia - New (to me), additional work-up planned (4)  1. As above  4. Morbid obesity - BMI 40 - stable chronic illness      I spent 30 minutes caring for Fabrizio on this date of service. This time includes time spent by me in the following activities: preparing for the visit, reviewing tests, obtaining and/or reviewing a separately obtained history, performing a medically appropriate examination and/or evaluation , counseling and educating the patient/family/caregiver, ordering medications, tests, or procedures, documenting information in the medical record and care coordination; discussing PAP therapy and Further testing    RTC 2 weeks after testing. Patient agrees to return sooner if changes in symptoms.           This document has been electronically signed by BRIDGET Tejeda on February 14, 2022 14:03 CST          CC: Prachi Sevilla APRN          No ref. provider found

## 2022-02-20 DIAGNOSIS — G47.33 OSA (OBSTRUCTIVE SLEEP APNEA): Primary | ICD-10-CM

## 2022-02-20 DIAGNOSIS — E11.00 TYPE 2 DIABETES MELLITUS WITH HYPEROSMOLARITY WITHOUT COMA, UNSPECIFIED WHETHER LONG TERM INSULIN USE: ICD-10-CM

## 2022-02-21 DIAGNOSIS — G47.33 OBSTRUCTIVE SLEEP APNEA, ADULT: Primary | ICD-10-CM

## 2022-02-21 NOTE — PROGRESS NOTES
Patient was called with results of his home sleep study performed on 02/14/2022. The AHI was 10, RDI was 21. Pulse range of  bpm. O2 ant of 82% with no sustained hypoxia.    Patient voiced understanding of HST results. He wishes to proceed with autoCPAP 5-20 cm H2O ([facility]).    Will send orders and arrange for follow up in 30-90 days with compliance report.     Advised patient to call if any trouble with PAP adaptation.

## 2022-03-12 DIAGNOSIS — E11.42 DIABETIC POLYNEUROPATHY ASSOCIATED WITH TYPE 2 DIABETES MELLITUS: ICD-10-CM

## 2022-03-14 DIAGNOSIS — E11.42 DIABETIC POLYNEUROPATHY ASSOCIATED WITH TYPE 2 DIABETES MELLITUS: ICD-10-CM

## 2022-03-14 RX ORDER — MEXILETINE HYDROCHLORIDE 150 MG/1
CAPSULE ORAL
Qty: 180 CAPSULE | Refills: 2 | OUTPATIENT
Start: 2022-03-14

## 2022-03-14 NOTE — TELEPHONE ENCOUNTER
PT WIFE IS CALLING AGAIN FOR RX. REFILLS (90 DAYS) ALSO YOU INCREASED RX IN DEC ? CAN THIS BE CALLED IN ASAP AS PT ONLY HAS 3 DAYS LEFT.     PLEASE ADVISE.     [facility] #[id number] - [place], [place] [facility] [address] [facility] [phone] [facility] [phone]   [phone]

## 2022-03-16 RX ORDER — MEXILETINE HYDROCHLORIDE 250 MG/1
250 CAPSULE ORAL 2 TIMES DAILY
Qty: 180 CAPSULE | Refills: 0 | Status: SHIPPED | OUTPATIENT
Start: 2022-03-16 | End: 2022-07-05

## 2022-03-30 ENCOUNTER — DOCUMENTATION (OUTPATIENT)
Dept: SLEEP MEDICINE | Facility: HOSPITAL | Age: 42
End: 2022-03-30

## 2022-03-30 NOTE — PROGRESS NOTES
Patient called stating the pressure on his CPAP has been ramping up too high at night and wakes him up frequently. I pulled his compliance report, showing:    PAP Data:  Time frame: 03/14/2022-03/29/2022   Compliance 100%  Average use on days used: 4 hrs 4 min  Percent of days with usage greater than or equal to 4 hours: 56%  PAP range : 5-20 cm H2O  Average 90% pressure: 9.7 cmH2O  Leak: 24.7 L/minute  Average AHI: 1.6 events/hr    Will adjust pressure setting to 6-12 cm H2O and adjust ramp time to 15 minutes.

## 2022-05-02 ENCOUNTER — OFFICE VISIT (OUTPATIENT)
Dept: SLEEP MEDICINE | Facility: HOSPITAL | Age: 42
End: 2022-05-02

## 2022-05-02 VITALS
BODY MASS INDEX: 39.94 KG/M2 | WEIGHT: 279 LBS | DIASTOLIC BLOOD PRESSURE: 80 MMHG | HEIGHT: 70 IN | HEART RATE: 100 BPM | SYSTOLIC BLOOD PRESSURE: 142 MMHG | OXYGEN SATURATION: 95 %

## 2022-05-02 DIAGNOSIS — F51.04 PSYCHOPHYSIOLOGICAL INSOMNIA: ICD-10-CM

## 2022-05-02 DIAGNOSIS — E66.01 MORBID OBESITY: ICD-10-CM

## 2022-05-02 DIAGNOSIS — G47.33 OBSTRUCTIVE SLEEP APNEA, ADULT: Primary | ICD-10-CM

## 2022-05-02 PROCEDURE — 99212 OFFICE O/P EST SF 10 MIN: CPT | Performed by: NURSE PRACTITIONER

## 2022-05-02 RX ORDER — AMLODIPINE BESYLATE 10 MG/1
TABLET ORAL
COMMUNITY
Start: 2022-04-27

## 2022-05-02 RX ORDER — TRAZODONE HYDROCHLORIDE 100 MG/1
100 TABLET ORAL NIGHTLY
Qty: 30 TABLET | Refills: 5 | Status: SHIPPED | OUTPATIENT
Start: 2022-05-02 | End: 2022-06-03 | Stop reason: SDUPTHER

## 2022-05-02 NOTE — PROGRESS NOTES
Sleep Clinic Follow Up    Date: 2022  Primary Care Provider: Prachi Sevilla APRN    Last office visit: 2022 (I reviewed this note)    CC: Follow up: WINSTON started on CPAP      Interim History:  Since the last visit:    1) mild WINSTON -  Fabrizio Castaneda has not completely remained compliant with CPAP. He denies mask and machine issues, dry mouth, headaches, or pressures intolerance. He denies abnormal dreams, sleep paralysis, nasal congestion, URI sx. He has trouble keeping his mask on all night due to getting up multiple times to the bathroom and forgetting to put it back on. Since starting CPAP therapy, patient reports slightly less daytime sleepiness and more refreshing sleep. He has less nighttime awakenings, but still has some interruption in his sleep.    2) Insomnia - Patient is still having trouble falling asleep and staying asleep. He is waking up less through the night with CPAP than he was before, but he is still having frequent interruptions in his sleep. We discussed medication options and patient wishes to try trazodone.    Sleep Testin. HST on 2022, AHI of 10    2. Currently on 6-12 cm H2O    PAP Data:    Time frame: 2022-2022   Compliance: 57%  Average use on days used: 3 hrs 19 min  Percent of days with usage greater than or equal to 4 hours: 22%  PAP range: 6-12 cm H2O  Average 90% pressure: 9.2 cmH2O  Leak: 24.9 L/minutes  Average AHI: 1.6 events/hr  Mask type: Full face mask  DME: [facility]    Bed time: 0700  Sleep latency: 30-60 minutes  Number of times awakens during the night: 1-2  Wake time: 1200  Estimated total sleep time at night: 4-5 hours  Caffeine intake: 0 cups of coffee, 0 cups of tea, and 4 sodas per day  Alcohol intake: 0 drinks per week  Nap time: very rare   Sleepiness with Driving: none     [place] - 6    PMHx, FH, SH reviewed and pertinent changes are: Reportedly unchanged from last office visit with us.      REVIEW OF SYSTEMS:   Negative  for chest pain, SOA, fever, chills, cough, N/V/D, abdominal pain.    Smoking:> 2 ppd    Fabrizio Castaneda  reports that he has been smoking cigarettes. He has never used smokeless tobacco.. I have educated him on the risk of diseases from using tobacco products such as cancer, COPD and heart disease.     I advised him to quit and he is not willing to quit.      Exam:  Vitals:    05/02/22 1007   BP: 142/80   Pulse: 100   SpO2: 95%           05/02/22  0949 05/02/22  1007   Weight: 127 kg (279 lb) 127 kg (279 lb)     Body mass index is 40.03 kg/m². Class 3 Severe Obesity (BMI >=40). Obesity-related health conditions include the following: obstructive sleep apnea, hypertension, diabetes mellitus, dyslipidemias and GERD. Obesity is unchanged. BMI is is above average; BMI management plan is completed. I recommend portion control and increasing exercise.      Gen:                No distress, conversant, pleasant, appears stated age, alert, oriented  Eyes:               Anicteric sclera, moist conjunctiva, no lid lag                           PERRL, EOMI   Heent:             NC/AT                          Oropharynx clear                          Normal hearing  Lungs:             Normal effort, non-labored breathing      CV:                  Normal S1/S2                          No lower extremity edema  ABD:               Rounded, non-distended                  Psych:             Appropriate affect  Neuro:             CN 2-12 appear intact    Past Medical History:   Diagnosis Date   • Diabetes mellitus (HCC)    • Diabetic neuropathy (HCC)    • GERD (gastroesophageal reflux disease)    • Hypertension        Current Outpatient Medications:   •  amLODIPine (NORVASC) 10 MG tablet, , Disp: , Rfl:   •  aspirin 81 MG chewable tablet, Chew 81 mg Daily., Disp: , Rfl:   •  DULoxetine (CYMBALTA) 60 MG capsule, Take 1 capsule by mouth 2 (Two) Times a Day., Disp: 180 capsule, Rfl: 1  •  gabapentin (NEURONTIN) 800 MG tablet,  Take 800 mg by mouth 4 (Four) Times a Day., Disp: , Rfl:   •  metFORMIN (GLUCOPHAGE) 500 MG tablet, Take 1,000 mg by mouth 2 (Two) Times a Day., Disp: , Rfl: 0  •  mexiletine (MEXITIL) 250 MG capsule, Take 1 capsule by mouth 2 (Two) Times a Day., Disp: 180 capsule, Rfl: 0  •  omeprazole (priLOSEC) 20 MG capsule, Take 20 mg by mouth Daily., Disp: , Rfl:   •  pravastatin (PRAVACHOL) 80 MG tablet, Take 80 mg by mouth Daily., Disp: , Rfl:   •  amLODIPine (NORVASC) 5 MG tablet, Take 5 mg by mouth Daily., Disp: , Rfl:   •  traZODone (DESYREL) 100 MG tablet, Take 1 tablet by mouth Every Night., Disp: 30 tablet, Rfl: 5    WBC   Date Value Ref Range Status   11/27/2018 6.98 3.20 - 9.80 10*3/mm3 Final     RBC   Date Value Ref Range Status   11/27/2018 5.89 (H) 4.37 - 5.74 10*6/mm3 Final     Hemoglobin   Date Value Ref Range Status   11/27/2018 16.9 13.7 - 17.3 g/dL Final     Hematocrit   Date Value Ref Range Status   11/27/2018 47.5 39.0 - 49.0 % Final     MCV   Date Value Ref Range Status   11/27/2018 80.6 80.0 - 98.0 fL Final     MCH   Date Value Ref Range Status   11/27/2018 28.7 26.5 - 34.0 pg Final     MCHC   Date Value Ref Range Status   11/27/2018 35.6 31.5 - 36.3 g/dL Final     RDW   Date Value Ref Range Status   11/27/2018 12.1 11.5 - 14.5 % Final     RDW-SD   Date Value Ref Range Status   11/27/2018 35.2 35.1 - 43.9 fl Final     MPV   Date Value Ref Range Status   11/27/2018 11.2 8.0 - 12.0 fL Final     Platelets   Date Value Ref Range Status   11/27/2018 152 150 - 450 10*3/mm3 Final     Neutrophil %   Date Value Ref Range Status   11/27/2018 61.7 37.0 - 80.0 % Final     Lymphocyte %   Date Value Ref Range Status   11/27/2018 29.7 10.0 - 50.0 % Final     Monocyte %   Date Value Ref Range Status   11/27/2018 5.4 0.0 - 12.0 % Final     Eosinophil %   Date Value Ref Range Status   11/27/2018 2.7 0.0 - 7.0 % Final     Basophil %   Date Value Ref Range Status   11/27/2018 0.4 0.0 - 2.0 % Final     Immature Grans %   Date  Value Ref Range Status   11/27/2018 0.1 0.0 - 0.5 % Final     Neutrophils, Absolute   Date Value Ref Range Status   11/27/2018 4.30 2.00 - 8.60 10*3/mm3 Final     Lymphocytes, Absolute   Date Value Ref Range Status   11/27/2018 2.07 0.60 - 4.20 10*3/mm3 Final     Monocytes, Absolute   Date Value Ref Range Status   11/27/2018 0.38 0.00 - 0.90 10*3/mm3 Final     Eosinophils, Absolute   Date Value Ref Range Status   11/27/2018 0.19 0.00 - 0.70 10*3/mm3 Final     Basophils, Absolute   Date Value Ref Range Status   11/27/2018 0.03 0.00 - 0.20 10*3/mm3 Final     Immature Grans, Absolute   Date Value Ref Range Status   11/27/2018 0.01 0.00 - 0.02 10*3/mm3 Final       Lab Results   Component Value Date    GLUCOSE 217 (H) 11/27/2018    BUN 8 11/27/2018    CREATININE 0.69 (L) 11/27/2018    EGFRIFNONA 130 11/27/2018    BCR 11.6 11/27/2018    K 3.9 11/27/2018    CO2 32.0 (H) 11/27/2018    CALCIUM 9.4 11/27/2018    ALBUMIN 4.40 11/27/2018     (H) 11/27/2018     (H) 11/27/2018       Assessment and Plan:    1. Obstructive sleep apnea - Established, stable (1)  1. Mostly compliant with PAP therapy  2. Continue PAP as prescribed  3. Script for PAP supplies  4. Return to clinic in 1 year with compliance report unless change in symptoms in interim period  2. Insomnia - sleep onset and or maintenance - Established, not controlled   1.   Trazodone 100 mg QHS - discussed medication safety   2.   Refills as needed, follow up sooner if medication ineffective or if side effects noted  3. Morbid obesity - BMI 40 - stable chronic illness  4. Nicotine dependence without complication - stable chronic illness  1. Smoking cessation information provided      I spent 15 minutes caring for Fabrizio on this date of service. This time includes time spent by me in the following activities: preparing for the visit, reviewing tests, obtaining and/or reviewing a separately obtained history, performing a medically appropriate examination  and/or evaluation , counseling and educating the patient/family/caregiver, ordering medications, tests, or procedures, documenting information in the medical record and care coordination; discussing PAP therapy, PAP compliance, PAP maintenance and Medication changes    RTC in 12 months. Patient agrees to return sooner if changes in symptoms.        This document has been electronically signed by BRIDGET Tejeda on May 2, 2022 10:07 CDT          CC: Prachi Sevilla APRN          No ref. provider found

## 2022-06-03 RX ORDER — TRAZODONE HYDROCHLORIDE 100 MG/1
100 TABLET ORAL NIGHTLY
Qty: 90 TABLET | Refills: 1 | Status: SHIPPED | OUTPATIENT
Start: 2022-06-03 | End: 2022-12-14

## 2022-06-03 NOTE — PROGRESS NOTES
Paper refill request for trazodone 100 mg QHS, 90 day supply from [facility]. Will refill today. Has yearly follow up scheduled in 11 months.

## 2022-07-01 DIAGNOSIS — E11.42 DIABETIC POLYNEUROPATHY ASSOCIATED WITH TYPE 2 DIABETES MELLITUS: Primary | ICD-10-CM

## 2022-07-05 RX ORDER — MEXILETINE HYDROCHLORIDE 250 MG/1
CAPSULE ORAL
Qty: 60 CAPSULE | Refills: 0 | Status: SHIPPED | OUTPATIENT
Start: 2022-07-05

## 2022-07-12 ENCOUNTER — TRANSCRIBE ORDERS (OUTPATIENT)
Dept: PODIATRY | Facility: CLINIC | Age: 42
End: 2022-07-12

## 2022-07-12 DIAGNOSIS — M79.672 BILATERAL FOOT PAIN: Primary | ICD-10-CM

## 2022-07-12 DIAGNOSIS — M79.671 BILATERAL FOOT PAIN: Primary | ICD-10-CM

## 2022-07-12 DIAGNOSIS — M79.673 PAIN OF FOOT, UNSPECIFIED LATERALITY: ICD-10-CM

## 2022-07-24 DIAGNOSIS — E11.42 DIABETIC POLYNEUROPATHY ASSOCIATED WITH TYPE 2 DIABETES MELLITUS: ICD-10-CM

## 2022-07-26 RX ORDER — DULOXETIN HYDROCHLORIDE 60 MG/1
CAPSULE, DELAYED RELEASE ORAL
Qty: 180 CAPSULE | Refills: 0 | Status: SHIPPED | OUTPATIENT
Start: 2022-07-26 | End: 2022-10-31

## 2022-10-29 DIAGNOSIS — E11.42 DIABETIC POLYNEUROPATHY ASSOCIATED WITH TYPE 2 DIABETES MELLITUS: ICD-10-CM

## 2022-10-31 RX ORDER — DULOXETIN HYDROCHLORIDE 60 MG/1
CAPSULE, DELAYED RELEASE ORAL
Qty: 60 CAPSULE | Refills: 0 | Status: SHIPPED | OUTPATIENT
Start: 2022-10-31 | End: 2022-11-29

## 2022-11-28 DIAGNOSIS — E11.42 DIABETIC POLYNEUROPATHY ASSOCIATED WITH TYPE 2 DIABETES MELLITUS: ICD-10-CM

## 2022-11-29 RX ORDER — DULOXETIN HYDROCHLORIDE 60 MG/1
CAPSULE, DELAYED RELEASE ORAL
Qty: 60 CAPSULE | Refills: 0 | Status: SHIPPED | OUTPATIENT
Start: 2022-11-29 | End: 2022-12-01 | Stop reason: SDUPTHER

## 2022-12-01 DIAGNOSIS — E11.42 DIABETIC POLYNEUROPATHY ASSOCIATED WITH TYPE 2 DIABETES MELLITUS: ICD-10-CM

## 2022-12-02 RX ORDER — DULOXETIN HYDROCHLORIDE 60 MG/1
60 CAPSULE, DELAYED RELEASE ORAL 2 TIMES DAILY
Qty: 180 CAPSULE | Refills: 0 | Status: SHIPPED | OUTPATIENT
Start: 2022-12-02

## 2022-12-14 RX ORDER — TRAZODONE HYDROCHLORIDE 100 MG/1
TABLET ORAL
Qty: 90 TABLET | Refills: 1 | Status: SHIPPED | OUTPATIENT
Start: 2022-12-14 | End: 2022-12-29 | Stop reason: SDUPTHER

## 2022-12-29 ENCOUNTER — DOCUMENTATION (OUTPATIENT)
Dept: SLEEP MEDICINE | Facility: HOSPITAL | Age: 42
End: 2022-12-29
Payer: COMMERCIAL

## 2022-12-29 RX ORDER — TRAZODONE HYDROCHLORIDE 100 MG/1
100 TABLET ORAL
Qty: 90 TABLET | Refills: 2 | Status: SHIPPED | OUTPATIENT
Start: 2022-12-29 | End: 2023-03-29

## 2023-03-06 DIAGNOSIS — E11.42 DIABETIC POLYNEUROPATHY ASSOCIATED WITH TYPE 2 DIABETES MELLITUS: ICD-10-CM

## 2023-03-08 RX ORDER — DULOXETIN HYDROCHLORIDE 60 MG/1
CAPSULE, DELAYED RELEASE ORAL
Qty: 180 CAPSULE | Refills: 0 | OUTPATIENT
Start: 2023-03-08